# Patient Record
Sex: FEMALE | Race: WHITE | NOT HISPANIC OR LATINO | Employment: OTHER | URBAN - METROPOLITAN AREA
[De-identification: names, ages, dates, MRNs, and addresses within clinical notes are randomized per-mention and may not be internally consistent; named-entity substitution may affect disease eponyms.]

---

## 2020-01-06 ENCOUNTER — CONSULT (OUTPATIENT)
Dept: ONCOLOGY | Facility: CLINIC | Age: 70
End: 2020-01-06

## 2020-01-06 ENCOUNTER — LAB (OUTPATIENT)
Dept: LAB | Facility: HOSPITAL | Age: 70
End: 2020-01-06

## 2020-01-06 VITALS
SYSTOLIC BLOOD PRESSURE: 134 MMHG | TEMPERATURE: 97.2 F | HEIGHT: 69 IN | DIASTOLIC BLOOD PRESSURE: 78 MMHG | HEART RATE: 70 BPM | RESPIRATION RATE: 12 BRPM | OXYGEN SATURATION: 100 % | BODY MASS INDEX: 20.71 KG/M2 | WEIGHT: 139.8 LBS

## 2020-01-06 DIAGNOSIS — D05.11 DUCTAL CARCINOMA IN SITU (DCIS) OF RIGHT BREAST: Primary | ICD-10-CM

## 2020-01-06 DIAGNOSIS — C50.919 MALIGNANT NEOPLASM OF FEMALE BREAST, UNSPECIFIED ESTROGEN RECEPTOR STATUS, UNSPECIFIED LATERALITY, UNSPECIFIED SITE OF BREAST (HCC): Primary | ICD-10-CM

## 2020-01-06 LAB
BASOPHILS # BLD AUTO: 0.03 10*3/MM3 (ref 0–0.2)
BASOPHILS NFR BLD AUTO: 0.7 % (ref 0–1.5)
DEPRECATED RDW RBC AUTO: 45.5 FL (ref 37–54)
EOSINOPHIL # BLD AUTO: 0.09 10*3/MM3 (ref 0–0.4)
EOSINOPHIL NFR BLD AUTO: 2.2 % (ref 0.3–6.2)
ERYTHROCYTE [DISTWIDTH] IN BLOOD BY AUTOMATED COUNT: 13.5 % (ref 12.3–15.4)
HCT VFR BLD AUTO: 46.1 % (ref 34–46.6)
HGB BLD-MCNC: 14.4 G/DL (ref 12–15.9)
IMM GRANULOCYTES # BLD AUTO: 0 10*3/MM3 (ref 0–0.05)
IMM GRANULOCYTES NFR BLD AUTO: 0 % (ref 0–0.5)
LYMPHOCYTES # BLD AUTO: 1.3 10*3/MM3 (ref 0.7–3.1)
LYMPHOCYTES NFR BLD AUTO: 31.9 % (ref 19.6–45.3)
MCH RBC QN AUTO: 28.5 PG (ref 26.6–33)
MCHC RBC AUTO-ENTMCNC: 31.2 G/DL (ref 31.5–35.7)
MCV RBC AUTO: 91.3 FL (ref 79–97)
MONOCYTES # BLD AUTO: 0.35 10*3/MM3 (ref 0.1–0.9)
MONOCYTES NFR BLD AUTO: 8.6 % (ref 5–12)
NEUTROPHILS # BLD AUTO: 2.3 10*3/MM3 (ref 1.7–7)
NEUTROPHILS NFR BLD AUTO: 56.6 % (ref 42.7–76)
NRBC BLD AUTO-RTO: 0 /100 WBC (ref 0–0.2)
PLATELET # BLD AUTO: 229 10*3/MM3 (ref 140–450)
PMV BLD AUTO: 9.6 FL (ref 6–12)
RBC # BLD AUTO: 5.05 10*6/MM3 (ref 3.77–5.28)
WBC NRBC COR # BLD: 4.07 10*3/MM3 (ref 3.4–10.8)

## 2020-01-06 PROCEDURE — 99204 OFFICE O/P NEW MOD 45 MIN: CPT | Performed by: INTERNAL MEDICINE

## 2020-01-06 PROCEDURE — 36416 COLLJ CAPILLARY BLOOD SPEC: CPT

## 2020-01-06 PROCEDURE — 85025 COMPLETE CBC W/AUTO DIFF WBC: CPT

## 2020-01-06 NOTE — PROGRESS NOTES
Subjective     REASON FOR CONSULTATION: History of ductal carcinoma in situ of the right breast  Provide an opinion on any further workup or treatment                             REQUESTING PHYSICIAN:   Dr. Howard    RECORDS OBTAINED:  Records of the patients history including those obtained from the referring provider were reviewed and summarized in detail.      History of Present Illness   This is a pleasant 69-year-old woman who was previously treated at the [facility].  The patient had abnormal mammography performed 1/6/2018 showing a questionable abnormality in the right breast showing calcifications in the posterior breast and a six-month follow-up mammogram was recommended.  On the six-month follow-up mammogram 6/14/2018 there were noted to be increasing calcifications with pleomorphic morphology in the posterior right breast; a stereotactic biopsy was performed 6/21/2018 which showed high-grade ductal carcinoma in situ with comedonecrosis ER low +2% and weak, MA negative.  The patient decided to undergo bilateral mastectomies and a sentinel lymph node procedure was performed on the right.  The right breast showed ductal carcinoma in situ high nuclear grade with comedonecrosis measuring 2 cm in greatest dimension.  The deep margin was negative but close at 0.13 mm.  The left breast showed focal atypical ductal hyperplasia and fibrocystic changes.  Postoperatively the patient was seen by radiation oncology for close margins of DCIS, but radiation therapy was not currently recommended.  The patient underwent bilateral breast reconstruction.  Her case was presented at a breast conference at the previous treating location and it was recommended that she have annual breast MRI for screening/monitoring of the chest wall secondary to the close posterior margins.  Since that time the patient has required a pacemaker to be placed which of course will make MRI exams and possible.      The patient  "comes in today to establish care.  She has noticed no changes on the self breast exams.  She denies unusual pain, shortness of breath or cough.  She has no other concerns.    Past Medical History:   Diagnosis Date   • Arthritis    • Breast cancer (CMS/HCC)     Right   • H/O Heart block     3rd degree   • Heartburn    • History of cataract    • History of rectal bleeding    • Pacemaker         Past Surgical History:   Procedure Laterality Date   • COLONOSCOPY  2018   • MASTECTOMY Bilateral 2019   • PACEMAKER IMPLANTATION  2019        No current outpatient medications on file prior to visit.     No current facility-administered medications on file prior to visit.         ALLERGIES:  Allergies not on file     Social History     Socioeconomic History   • Marital status:      Spouse name: Not on file   • Number of children: Not on file   • Years of education: Not on file   • Highest education level: Not on file   Occupational History     Employer: RETIRED   Tobacco Use   • Smoking status: Never Smoker   • Smokeless tobacco: Never Used   Substance and Sexual Activity   • Alcohol use: Yes     Comment: Social   • Drug use: Never        History reviewed. No pertinent family history.     Review of Systems   Constitutional: Negative.    HENT: Negative.    Respiratory: Negative.    Cardiovascular: Negative.    Gastrointestinal: Negative.    Genitourinary: Negative.    Musculoskeletal: Positive for back pain. Negative for arthralgias and myalgias.   Skin: Negative.    Allergic/Immunologic: Negative.    Neurological: Negative.    Hematological: Negative.    Psychiatric/Behavioral: Negative.           Objective     Vitals:    01/06/20 1545   BP: 134/78   Pulse: 70   Resp: 12   Temp: 97.2 °F (36.2 °C)   TempSrc: Oral   SpO2: 100%   Weight: 63.4 kg (139 lb 12.8 oz)   Height: 175.2 cm (68.98\")   PainSc: 0-No pain     Current Status 1/6/2020   ECOG score 0       Physical Exam    CON: pleasant well-developed adult woman  HEENT: " no icterus, no thrush, moist membranes  NECK: no jvd  LYMPH: no cervical or supraclavicular lad  CV: RRR, S1S2, no murmur, pacemaker left chest wall  BREASTS: There have been bilateral mastectomies with reconstructed breast, no nodules or axillary lymph nodes noted  RESP: cta bilat, no wheezing, no rales  GI: soft, non-tender, no splenomegaly, +bs  MUSC: no edema, normal gait  NEURO: alert and oriented x3, normal strength  PSYCH: normal mood /affect    RECENT LABS:  Hematology WBC   Date Value Ref Range Status   01/06/2020 4.07 3.40 - 10.80 10*3/mm3 Final     RBC   Date Value Ref Range Status   01/06/2020 5.05 3.77 - 5.28 10*6/mm3 Final     Hemoglobin   Date Value Ref Range Status   01/06/2020 14.4 12.0 - 15.9 g/dL Final     Hematocrit   Date Value Ref Range Status   01/06/2020 46.1 34.0 - 46.6 % Final     Platelets   Date Value Ref Range Status   01/06/2020 229 140 - 450 10*3/mm3 Final          Assessment/Plan     This is a pleasant 69-year-old woman with history of ductal carcinoma in situ of the right breast treated with bilateral mastectomies summer 2018.  The DCIS was approximately 2 cm in size high-grade ER weakly 2% positive, ID negative.  The patient had close posterior margins but was reviewed by radiation oncology at an outside facility and recommended against adjuvant radiation therapy.  It was recommended from her previous tumor board to have an annual MRI of the breasts for follow-up of close margins.  However the patient now has a pacemaker in place so MRI exams will not be possible.  I do not think mammographic screening will be very beneficial in assessing the reconstructed breasts.  I recommended that we follow her based on physical examinations every 6 months.  The patient has not been performing self exams and I recommended she perform a monthly self breast exam with particular attention to the edges of the reconstructed breasts and the chest wall as much as possible.  Her exam was normal today  and I will see her back in 6 months.

## 2020-03-18 ENCOUNTER — TELEPHONE (OUTPATIENT)
Dept: ONCOLOGY | Facility: CLINIC | Age: 70
End: 2020-03-18

## 2020-03-18 DIAGNOSIS — N63.0 BREAST LUMP OR MASS: ICD-10-CM

## 2020-03-18 DIAGNOSIS — D05.11 DUCTAL CARCINOMA IN SITU (DCIS) OF RIGHT BREAST: Primary | ICD-10-CM

## 2020-03-18 NOTE — TELEPHONE ENCOUNTER
"Lengthy conversation with pt concerning a small pea size moveable lump she found near the axillary region of her rt. Breast.  Pt. Has had bilat. Mastectomies with reconstructive surgery in [place].  States I DID NOT RECEIVE ANY CHEMOTHERAPY OR RADIATION AND I DO NOT LIKE TO TAKE MEDICATIONS.  Goes on to say she lives with her fiance who is dr. Powers who really keeps up with everything and he isn't with her right now.  She called her surgeon in [place] when she found the lump and she was told she needed some x-rays etc.  Pt. States she was told to never get a MRI because she has a pacemaker.  States she faithfully does self breast exams.  She reports she did get established with a cardiologist here, but cannot think of the name.  She was told per her cardiologist that she has a pacemaker which is \"compatible with having a MRI.\"   States she was given a device called a \"donut\" which you place over the pacemaker when having a mri done.  Inquiring what she needs to do.  Pt. Doesn't have a return appt here.  All d/w dr. Blanco,  Informed pt. We need to get a letter from her cardiologist stating what the device is and they feel comfortable with her having mri's done.  Fax number given.  We will also get a u/s done of the rt. Breast for now.  Informed pt. The appt desk will call her with the date and time.  Pt. Request that it not be scheduled until after 3/31/20 as she and her fiance are getting ready to take a trip . Informed pt. I will let them know in the message that I send them.  V/u.  "

## 2020-04-01 ENCOUNTER — APPOINTMENT (OUTPATIENT)
Dept: ULTRASOUND IMAGING | Facility: HOSPITAL | Age: 70
End: 2020-04-01

## 2020-04-06 ENCOUNTER — HOSPITAL ENCOUNTER (OUTPATIENT)
Dept: ULTRASOUND IMAGING | Facility: HOSPITAL | Age: 70
Discharge: HOME | End: 2020-04-06
Admitting: INTERNAL MEDICINE

## 2020-04-06 ENCOUNTER — TELEPHONE (OUTPATIENT)
Dept: ONCOLOGY | Facility: HOSPITAL | Age: 70
End: 2020-04-06

## 2020-04-06 DIAGNOSIS — D05.11 DUCTAL CARCINOMA IN SITU (DCIS) OF RIGHT BREAST: ICD-10-CM

## 2020-04-06 DIAGNOSIS — N63.0 BREAST LUMP OR MASS: ICD-10-CM

## 2020-04-06 PROCEDURE — 76882 US LMTD JT/FCL EVL NVASC XTR: CPT

## 2020-04-06 NOTE — TELEPHONE ENCOUNTER
Pt called, per request of Dr. Blanco, to let her know that her US showed normal lymph nodes in the area of concern.  He recommended she see a breast surgeon if she is still concerned about what she is feeling.  Pt v/u and stated she did not have a breast surgeon in [place].  Discussed with Dr. Blanco and Chilo Nettles, or Lisa recommended.  Pt does not want us to make a referral or appointment at this time.  Pt given names of breast surgeons and instructed to call if needing referral.  Pt v/u.

## 2020-07-09 ENCOUNTER — TELEPHONE (OUTPATIENT)
Dept: ONCOLOGY | Facility: CLINIC | Age: 70
End: 2020-07-09

## 2020-07-09 NOTE — TELEPHONE ENCOUNTER
PT CALLED TO CANCEL HER 7/13 APPT WITH DR NAVARRO. SHE HAS FOUND ANOTHER DOCTOR AND WILL NOT BE RESCHEDULING.

## 2020-07-13 ENCOUNTER — APPOINTMENT (OUTPATIENT)
Dept: LAB | Facility: HOSPITAL | Age: 70
End: 2020-07-13

## 2021-08-10 NOTE — PROGRESS NOTES
"Chief Complaint   Patient presents with   • Abdominal Pain   • Diarrhea   • Bloated         History of Present Illness  Patient is a 71-year-old female who presents today for follow-up.    She had a colonoscopy November 2019.  Colonoscopy showed diverticulosis and a tortuous colon as well as internal hemorrhoids.  She had a CT performed in 2019 with no findings to explain symptoms.    Patient presents today with concerns about abdominal pain, bloating, gas, and change in bowel habits.  She reports symptoms began around 6 months ago.  Pain has been located to the left lower quadrant and described as an ache but also at times located to the periumbilical area.  She has noted increased gas and bloating and belching.    She reports she has been experiencing diarrhea, reports her bowel movements have been uncontrollable at times and loose and watery.  She denies any blood in the stool.    She denies any nausea or vomiting.    Prior to symptom onset, denied any travel or antibiotic use.    She saw her primary care provider for these symptoms and a CT of her chest and abdomen was performed.  CT abdomen with no acute findings, normal-appearing bowel.  She had lab work to evaluate for celiac disease which was negative.  CBC and CMP were normal.  GLENN was checked and was negative.  Sed rate and CRP also normal.      Review of Systems   Constitutional: Positive for diaphoresis. Negative for fever.        Result Review :       PHYSICIAN PROGRESS NOTES - SCAN - PROG NOTES-11/11/19 DR. SHERICE TRUJILLO (11/11/2019)   RADIOLOGY - SCAN - CT ABD/PELVIS-11/12/19 [facility] (11/12/2019)   PATHOLOGY - SCAN - PATH-RECTAL BX-11/22/19 DR. WOLFF [facility] (11/22/2019)   COLONOSCOPY - SCAN - COLONOSCOPY-11/22/19 DR. TEA WOLFF [facility] (11/22/2019)       Vital Signs:   /80   Pulse 73   Temp 97.7 °F (36.5 °C)   Ht 177.8 cm (70\")   Wt 62.6 kg (137 lb 14.4 oz)   SpO2 98%   BMI 19.79 kg/m²     Body mass index is 19.79 kg/m².     Physical " Exam  Vitals reviewed.   Constitutional:       General: She is not in acute distress.     Appearance: She is well-developed.   HENT:      Head: Normocephalic and atraumatic.   Pulmonary:      Effort: Pulmonary effort is normal. No respiratory distress.   Abdominal:      General: Abdomen is flat. Bowel sounds are normal. There is no distension.      Palpations: Abdomen is soft.      Tenderness: There is no abdominal tenderness.   Skin:     General: Skin is dry.      Coloration: Skin is not pale.   Neurological:      Mental Status: She is alert and oriented to person, place, and time.   Psychiatric:         Thought Content: Thought content normal.           Assessment and Plan    Diagnoses and all orders for this visit:    1. Diarrhea, unspecified type (Primary)  -     Gastrointestinal Panel, PCR - Stool, Per Rectum  -     Clostridium Difficile Toxin, PCR - Stool, Per Rectum  -     Fecal Leukocytes - Stool, Per Rectum    2. Left lower quadrant abdominal pain    3. Periumbilical abdominal pain    4. Bloating         Discussion  Patient presents today with new complaint of abdominal pain, bloating, and diarrhea.  We will have her submit a stool sample to evaluate for infection.  Discussed with her if stool sample is negative, symptoms may be secondary to IBS-D or possible small intestinal bacterial overgrowth.  If stool sample is negative, we will plan to send in prescription for Xifaxan to address these concerns.  If no improvement with Xifaxan, we may consider flexible sigmoidoscopy with random biopsies to evaluate for fluoroscopic colitis.          Follow Up   Return if symptoms worsen or fail to improve.    Patient Instructions   Obtain stool studies to assess for infection.    Further recommendations to be made pending results of the above work-up and clinical course.

## 2021-08-12 ENCOUNTER — OFFICE VISIT (OUTPATIENT)
Dept: GASTROENTEROLOGY | Facility: CLINIC | Age: 71
End: 2021-08-12

## 2021-08-12 VITALS
OXYGEN SATURATION: 98 % | HEART RATE: 73 BPM | BODY MASS INDEX: 19.74 KG/M2 | DIASTOLIC BLOOD PRESSURE: 80 MMHG | HEIGHT: 70 IN | TEMPERATURE: 97.7 F | SYSTOLIC BLOOD PRESSURE: 118 MMHG | WEIGHT: 137.9 LBS

## 2021-08-12 DIAGNOSIS — R10.32 LEFT LOWER QUADRANT ABDOMINAL PAIN: ICD-10-CM

## 2021-08-12 DIAGNOSIS — R19.7 DIARRHEA, UNSPECIFIED TYPE: Primary | ICD-10-CM

## 2021-08-12 DIAGNOSIS — R14.0 BLOATING: ICD-10-CM

## 2021-08-12 DIAGNOSIS — R10.33 PERIUMBILICAL ABDOMINAL PAIN: ICD-10-CM

## 2021-08-12 PROCEDURE — 99204 OFFICE O/P NEW MOD 45 MIN: CPT | Performed by: NURSE PRACTITIONER

## 2021-08-12 RX ORDER — MULTIPLE VITAMINS W/ MINERALS TAB 9MG-400MCG
TAB ORAL
COMMUNITY

## 2021-08-12 RX ORDER — MELATONIN
2000 DAILY
COMMUNITY
End: 2022-08-02

## 2021-08-12 NOTE — PATIENT INSTRUCTIONS
Obtain stool studies to assess for infection.    Further recommendations to be made pending results of the above work-up and clinical course.

## 2021-08-25 LAB
ADV 40+41 DNA STL QL NAA+NON-PROBE: NOT DETECTED
ASTRO TYP 1-8 RNA STL QL NAA+NON-PROBE: NOT DETECTED
C CAYETANENSIS DNA STL QL NAA+NON-PROBE: NOT DETECTED
C COLI+JEJ+UPSA DNA STL QL NAA+NON-PROBE: NOT DETECTED
C DIF TOX TCDA+TCDB STL QL NAA+NON-PROBE: NOT DETECTED
C DIFF TOX GENS STL QL NAA+PROBE: NEGATIVE
CRYPTOSP DNA STL QL NAA+NON-PROBE: NOT DETECTED
E COLI O157 DNA STL QL NAA+NON-PROBE: NORMAL
E HISTOLYT DNA STL QL NAA+NON-PROBE: NOT DETECTED
EAEC PAA PLAS AGGR+AATA ST NAA+NON-PRB: NOT DETECTED
EC STX1+STX2 GENES STL QL NAA+NON-PROBE: NOT DETECTED
EPEC EAE GENE STL QL NAA+NON-PROBE: NOT DETECTED
ETEC LTA+ST1A+ST1B TOX ST NAA+NON-PROBE: NOT DETECTED
G LAMBLIA DNA STL QL NAA+NON-PROBE: NOT DETECTED
NOROVIRUS GI+II RNA STL QL NAA+NON-PROBE: NOT DETECTED
P SHIGELLOIDES DNA STL QL NAA+NON-PROBE: NOT DETECTED
RVA RNA STL QL NAA+NON-PROBE: NOT DETECTED
S ENT+BONG DNA STL QL NAA+NON-PROBE: NOT DETECTED
SAPO I+II+IV+V RNA STL QL NAA+NON-PROBE: NOT DETECTED
SHIGELLA SP+EIEC IPAH ST NAA+NON-PROBE: NOT DETECTED
V CHOL+PARA+VUL DNA STL QL NAA+NON-PROBE: NOT DETECTED
V CHOLERAE DNA STL QL NAA+NON-PROBE: NOT DETECTED
WBC STL QL MICRO: NORMAL
WBC STL QL MICRO: NORMAL
Y ENTEROCOL DNA STL QL NAA+NON-PROBE: NOT DETECTED

## 2021-08-26 ENCOUNTER — TELEPHONE (OUTPATIENT)
Dept: GASTROENTEROLOGY | Facility: CLINIC | Age: 71
End: 2021-08-26

## 2021-08-26 NOTE — TELEPHONE ENCOUNTER
Xifaxan will cost the patient $1000 out of pocket. Patient wants to know if there is an alternative. Please advise.

## 2021-08-30 ENCOUNTER — TELEPHONE (OUTPATIENT)
Dept: GASTROENTEROLOGY | Facility: CLINIC | Age: 71
End: 2021-08-30

## 2021-08-30 NOTE — TELEPHONE ENCOUNTER
"Patient called with c/o stomach cramps & bloating, \"a lot of gas\", nausea, sweats X 2 last night. Denies fever. Denies diarrhea; states bowel movements are soft, but formed. She's been taking Xifaxan for about a week, so she stopped taking it when the symptoms began. She did says the symptoms come and go. She has appt with cc on 9/3/21, but wanted to know if there was something she could do in the meantime. Please advise.   "

## 2021-08-30 NOTE — TELEPHONE ENCOUNTER
I would recommend that she complete the full course of Xifaxan. We can also try dicyclomine 10 mg BID PRN cramping to see if that would help.

## 2021-08-31 DIAGNOSIS — R10.9 ABDOMINAL CRAMPING: ICD-10-CM

## 2021-08-31 DIAGNOSIS — R19.7 DIARRHEA, UNSPECIFIED TYPE: Primary | ICD-10-CM

## 2021-08-31 RX ORDER — DICYCLOMINE HYDROCHLORIDE 10 MG/1
10 CAPSULE ORAL 2 TIMES DAILY PRN
Qty: 60 CAPSULE | Refills: 5 | Status: SHIPPED | OUTPATIENT
Start: 2021-08-31 | End: 2022-08-02

## 2021-09-03 ENCOUNTER — OFFICE VISIT (OUTPATIENT)
Dept: GASTROENTEROLOGY | Facility: CLINIC | Age: 71
End: 2021-09-03

## 2021-09-03 DIAGNOSIS — K58.0 IRRITABLE BOWEL SYNDROME WITH DIARRHEA: Primary | ICD-10-CM

## 2021-09-03 PROCEDURE — 99441 PR PHYS/QHP TELEPHONE EVALUATION 5-10 MIN: CPT | Performed by: NURSE PRACTITIONER

## 2021-09-03 NOTE — PROGRESS NOTES
Chief Complaint   Patient presents with   • Follow-up         History of Present Illness  Patient is a 71-year-old female who presents today for follow-up.  She was last seen in the office August 2021 for abdominal pain, bloating, diarrhea.  Stool studies were negative and she was prescribed Xifaxan for IBS-D/possible small intestinal bacterial overgrowth.  She had undergone a CT scan and lab work with her primary care provider which were unremarkable.    Patient presents today for follow-up.  She has been taking Xifaxan, has a few days left, but reports her symptoms have improved.  She is no longer having any abdominal pain or bloating.  Her bowels are more solid.  She is down to having around 2 bowel movements per day.  She has noted some increased gas and heartburn but otherwise feels as though she is almost back to normal.    You have chosen to receive care through a telephone visit.   Do you consent to use a telephone visit for your medical care today? Yes         Result Review :      Gastrointestinal Panel, PCR - Stool, Per Rectum (08/23/2021 11:30)   Clostridium Difficile Toxin, PCR - Stool, Per Rectum (08/23/2021 11:30)   Fecal Leukocytes - Stool, Per Rectum (08/23/2021 11:30)   Office Visit with Danielle Calzada APRN (08/12/2021)   PHYSICIAN PROGRESS NOTES - SCAN - PROG NOTES-11/11/19 DR. SHERICE TRUJILLO (11/11/2019)   RADIOLOGY - SCAN - CT ABD/PELVIS-11/12/19 [facility] (11/12/2019)   PATHOLOGY - SCAN - PATH-RECTAL BX-11/22/19 DR. WOLFF [facility] (11/22/2019)   COLONOSCOPY - SCAN - COLONOSCOPY-11/22/19 DR. TEA WOLFF [facility] (11/22/2019)        Physical Exam - TELEPHONE VISIT      Assessment and Plan    Diagnoses and all orders for this visit:    1. Irritable bowel syndrome with diarrhea (Primary)         Discussion  Patient presents today for follow-up.  She has had resolution of pain and bloating and improvement in diarrhea after starting treatment with Xifaxan.  We will complete full course.  Discussed with  her today suspect symptoms likely secondary to IBS-D or possibly small intestinal bacterial overgrowth.  As symptoms have improved, no further treatment required.  Due to some persistent gas, recommended trial of low FODMAP diet and initiating a daily probiotic.  If symptoms recur in the future, can consider repeating course of Xifaxan if needed.      This visit has been rescheduled as a phone visit to comply with patient safety concerns in accordance with CDC recommendations. Total time of discussion was 8 minutes.    Follow Up   Return if symptoms worsen or fail to improve.    Patient Instructions   Recommend starting a daily probiotic.  Recommend Align or Fundability available over-the-counter.  Take 1 capsule daily with food.    For bloating, recommend trial of low FODMAP diet.  Handout provided for review.    Complete course of Xifaxan as directed.        Low-FODMAP Eating Plan    FODMAPs (fermentable oligosaccharides, disaccharides, monosaccharides, and polyols) are sugars that are hard for some people to digest. A low-FODMAP eating plan may help some people who have bowel (intestinal) diseases to manage their symptoms.  This meal plan can be complicated to follow. Work with a diet and nutrition specialist (dietitian) to make a low-FODMAP eating plan that is right for you. A dietitian can make sure that you get enough nutrition from this diet.  What are tips for following this plan?  Reading food labels  · Check labels for hidden FODMAPs such as:  ? High-fructose syrup.  ? Honey.  ? Agave.  ? Natural fruit flavors.  ? Onion or garlic powder.  · Choose low-FODMAP foods that contain 3-4 grams of fiber per serving.  · Check food labels for serving sizes. Eat only one serving at a time to make sure FODMAP levels stay low.  Meal planning  · Follow a low-FODMAP eating plan for up to 6 weeks, or as told by your health care provider or dietitian.  · To follow the eating plan:  1. Eliminate high-FODMAP foods  from your diet completely.  2. Gradually reintroduce high-FODMAP foods into your diet one at a time. Most people should wait a few days after introducing one high-FODMAP food before they introduce the next high-FODMAP food. Your dietitian can recommend how quickly you may reintroduce foods.  3. Keep a daily record of what you eat and drink, and make note of any symptoms that you have after eating.  4. Review your daily record with a dietitian regularly. Your dietitian can help you identify which foods you can eat and which foods you should avoid.  General tips  · Drink enough fluid each day to keep your urine pale yellow.  · Avoid processed foods. These often have added sugar and may be high in FODMAPs.  · Avoid most dairy products, whole grains, and sweeteners.  · Work with a dietitian to make sure you get enough fiber in your diet.  Recommended foods  Grains  · Gluten-free grains, such as rice, oats, buckwheat, quinoa, corn, polenta, and millet. Gluten-free pasta, bread, or cereal. Rice noodles. Corn tortillas.  Vegetables  · Eggplant, zucchini, cucumber, peppers, green beans, [place] sprouts, bean sprouts, lettuce, arugula, kale, Swiss chard, spinach, tod greens, bok sandra, summer squash, potato, and tomato. Limited amounts of corn, carrot, and sweet potato. Green parts of scallions.  Fruits  · Bananas, oranges, blayne, limes, blueberries, raspberries, strawberries, grapes, cantaloupe, honeydew melon, kiwi, papaya, passion fruit, and pineapple. Limited amounts of dried cranberries, banana chips, and shredded coconut.  Dairy  · Lactose-free milk, yogurt, and kefir. Lactose-free cottage cheese and ice cream. Non-dairy milks, such as almond, coconut, hemp, and rice milk. Yogurts made of non-dairy milks. Limited amounts of goat cheese, brie, mozzarella, parmesan, swiss, and other hard cheeses.  Meats and other protein foods  · Unseasoned beef, pork, poultry, or fish. Eggs. [place]. [place] (firm) and tempeh. Limited  "amounts of nuts and seeds, such as almonds, walnuts, brazil nuts, pecans, peanuts, pumpkin seeds, enrique seeds, and sunflower seeds.  Fats and oils  · Butter-free spreads. Vegetable oils, such as olive, canola, and sunflower oil.  Seasoning and other foods  · Artificial sweeteners with names that do not end in \"ol\" such as aspartame, saccharine, and stevia. Maple syrup, white table sugar, raw sugar, brown sugar, and molasses. Fresh basil, coriander, parsley, rosemary, and thyme.  Beverages  · Water and mineral water. Sugar-sweetened soft drinks. Small amounts of orange juice or cranberry juice. Black and green tea. Most dry thor. Coffee.  This may not be a complete list of low-FODMAP foods. Talk with your dietitian for more information.  Foods to avoid  Grains  · Wheat, including kamut, durum, and semolina. Barley and bulgur. Couscous. Wheat-based cereals. Wheat noodles, bread, crackers, and pastries.  Vegetables  · Chicory root, artichoke, asparagus, cabbage, snow peas, sugar snap peas, mushrooms, and cauliflower. Onions, garlic, leeks, and the white part of scallions.  Fruits  · Fresh, dried, and juiced forms of apple, pear, watermelon, peach, plum, cherries, apricots, blackberries, boysenberries, figs, nectarines, and sheba. Avocado.  Dairy  · Milk, yogurt, ice cream, and soft cheese. Cream and sour cream. Milk-based sauces. Custard.  Meats and other protein foods  · Fried or fatty meat. Sausage. Cashews and pistachios. Soybeans, baked beans, black beans, chickpeas, kidney beans, arlyn beans, navy beans, lentils, and split peas.  Seasoning and other foods  · Any sugar-free gum or candy. Foods that contain artificial sweeteners such as sorbitol, mannitol, isomalt, or xylitol. Foods that contain honey, high-fructose corn syrup, or agave. Bouillon, vegetable stock, beef stock, and chicken stock. Garlic and onion powder. Condiments made with onion, such as hummus, chutney, pickles, relish, salad dressing, and salsa. " Tomato paste.  Beverages  · Chicory-based drinks. Coffee substitutes. Chamomile tea. Fennel tea. Sweet or fortified thor such as port or jesús. Diet soft drinks made with isomalt, mannitol, maltitol, sorbitol, or xylitol. Apple, pear, and sheba juice. Juices with high-fructose corn syrup.  This may not be a complete list of high-FODMAP foods. Talk with your dietitian to discuss what dietary choices are best for you.   Summary  · A low-FODMAP eating plan is a short-term diet that eliminates FODMAPs from your diet to help ease symptoms of certain bowel diseases.  · The eating plan usually lasts up to 6 weeks. After that, high-FODMAP foods are restarted gradually, one at a time, so you can find out which may be causing symptoms.  · A low-FODMAP eating plan can be complicated. It is best to work with a dietitian who has experience with this type of plan.  This information is not intended to replace advice given to you by your health care provider. Make sure you discuss any questions you have with your health care provider.  Document Revised: 11/30/2018 Document Reviewed: 08/14/2018  [employer] Patient Education © 2021 [employer]

## 2021-09-03 NOTE — PATIENT INSTRUCTIONS
Recommend starting a daily probiotic.  Recommend Align or [employer] available over-the-counter.  Take 1 capsule daily with food.    For bloating, recommend trial of low FODMAP diet.  Handout provided for review.    Complete course of Xifaxan as directed.        Low-FODMAP Eating Plan    FODMAPs (fermentable oligosaccharides, disaccharides, monosaccharides, and polyols) are sugars that are hard for some people to digest. A low-FODMAP eating plan may help some people who have bowel (intestinal) diseases to manage their symptoms.  This meal plan can be complicated to follow. Work with a diet and nutrition specialist (dietitian) to make a low-FODMAP eating plan that is right for you. A dietitian can make sure that you get enough nutrition from this diet.  What are tips for following this plan?  Reading food labels  · Check labels for hidden FODMAPs such as:  ? High-fructose syrup.  ? Honey.  ? Agave.  ? Natural fruit flavors.  ? Onion or garlic powder.  · Choose low-FODMAP foods that contain 3-4 grams of fiber per serving.  · Check food labels for serving sizes. Eat only one serving at a time to make sure FODMAP levels stay low.  Meal planning  · Follow a low-FODMAP eating plan for up to 6 weeks, or as told by your health care provider or dietitian.  · To follow the eating plan:  1. Eliminate high-FODMAP foods from your diet completely.  2. Gradually reintroduce high-FODMAP foods into your diet one at a time. Most people should wait a few days after introducing one high-FODMAP food before they introduce the next high-FODMAP food. Your dietitian can recommend how quickly you may reintroduce foods.  3. Keep a daily record of what you eat and drink, and make note of any symptoms that you have after eating.  4. Review your daily record with a dietitian regularly. Your dietitian can help you identify which foods you can eat and which foods you should avoid.  General tips  · Drink enough fluid each day to keep  "your urine pale yellow.  · Avoid processed foods. These often have added sugar and may be high in FODMAPs.  · Avoid most dairy products, whole grains, and sweeteners.  · Work with a dietitian to make sure you get enough fiber in your diet.  Recommended foods  Grains  · Gluten-free grains, such as rice, oats, buckwheat, quinoa, corn, polenta, and millet. Gluten-free pasta, bread, or cereal. Rice noodles. Corn tortillas.  Vegetables  · Eggplant, zucchini, cucumber, peppers, green beans, [place] sprouts, bean sprouts, lettuce, arugula, kale, Swiss chard, spinach, tod greens, bok sandra, summer squash, potato, and tomato. Limited amounts of corn, carrot, and sweet potato. Green parts of scallions.  Fruits  · Bananas, oranges, blayne, limes, blueberries, raspberries, strawberries, grapes, cantaloupe, honeydew melon, kiwi, papaya, passion fruit, and pineapple. Limited amounts of dried cranberries, banana chips, and shredded coconut.  Dairy  · Lactose-free milk, yogurt, and kefir. Lactose-free cottage cheese and ice cream. Non-dairy milks, such as almond, coconut, hemp, and rice milk. Yogurts made of non-dairy milks. Limited amounts of goat cheese, brie, mozzarella, parmesan, swiss, and other hard cheeses.  Meats and other protein foods  · Unseasoned beef, pork, poultry, or fish. Eggs. Carreno. Tofu (firm) and tempeh. Limited amounts of nuts and seeds, such as almonds, walnuts, brazil nuts, pecans, peanuts, pumpkin seeds, enrique seeds, and sunflower seeds.  Fats and oils  · Butter-free spreads. Vegetable oils, such as olive, canola, and sunflower oil.  Seasoning and other foods  · Artificial sweeteners with names that do not end in \"ol\" such as aspartame, saccharine, and stevia. Maple syrup, white table sugar, raw sugar, brown sugar, and molasses. Fresh basil, coriander, parsley, rosemary, and thyme.  Beverages  · Water and mineral water. Sugar-sweetened soft drinks. Small amounts of orange juice or cranberry juice. Black " and green tea. Most dry thor. Coffee.  This may not be a complete list of low-FODMAP foods. Talk with your dietitian for more information.  Foods to avoid  Grains  · Wheat, including kamut, durum, and semolina. Barley and bulgur. Couscous. Wheat-based cereals. Wheat noodles, bread, crackers, and pastries.  Vegetables  · Chicory root, artichoke, asparagus, cabbage, snow peas, sugar snap peas, mushrooms, and cauliflower. Onions, garlic, leeks, and the white part of scallions.  Fruits  · Fresh, dried, and juiced forms of apple, pear, watermelon, peach, plum, cherries, apricots, blackberries, boysenberries, figs, nectarines, and sheba. Avocado.  Dairy  · Milk, yogurt, ice cream, and soft cheese. Cream and sour cream. Milk-based sauces. Custard.  Meats and other protein foods  · Fried or fatty meat. Sausage. Cashews and pistachios. Soybeans, baked beans, black beans, chickpeas, kidney beans, arlyn beans, navy beans, lentils, and split peas.  Seasoning and other foods  · Any sugar-free gum or candy. Foods that contain artificial sweeteners such as sorbitol, mannitol, isomalt, or xylitol. Foods that contain honey, high-fructose corn syrup, or agave. Bouillon, vegetable stock, beef stock, and chicken stock. Garlic and onion powder. Condiments made with onion, such as hummus, chutney, pickles, relish, salad dressing, and salsa. Tomato paste.  Beverages  · Chicory-based drinks. Coffee substitutes. Chamomile tea. Fennel tea. Sweet or fortified thor such as port or jesús. Diet soft drinks made with isomalt, mannitol, maltitol, sorbitol, or xylitol. Apple, pear, and sheba juice. Juices with high-fructose corn syrup.  This may not be a complete list of high-FODMAP foods. Talk with your dietitian to discuss what dietary choices are best for you.   Summary  · A low-FODMAP eating plan is a short-term diet that eliminates FODMAPs from your diet to help ease symptoms of certain bowel diseases.  · The eating plan usually lasts up to  6 weeks. After that, high-FODMAP foods are restarted gradually, one at a time, so you can find out which may be causing symptoms.  · A low-FODMAP eating plan can be complicated. It is best to work with a dietitian who has experience with this type of plan.  This information is not intended to replace advice given to you by your health care provider. Make sure you discuss any questions you have with your health care provider.  Document Revised: 11/30/2018 Document Reviewed: 08/14/2018  Elsevier Patient Education © 2021 [employer] Inc.

## 2022-08-01 ENCOUNTER — TELEPHONE (OUTPATIENT)
Dept: GASTROENTEROLOGY | Facility: CLINIC | Age: 72
End: 2022-08-01

## 2022-08-02 ENCOUNTER — PREP FOR SURGERY (OUTPATIENT)
Dept: SURGERY | Facility: SURGERY CENTER | Age: 72
End: 2022-08-02

## 2022-08-02 ENCOUNTER — OFFICE VISIT (OUTPATIENT)
Dept: GASTROENTEROLOGY | Facility: CLINIC | Age: 72
End: 2022-08-02

## 2022-08-02 ENCOUNTER — TELEPHONE (OUTPATIENT)
Dept: GASTROENTEROLOGY | Facility: CLINIC | Age: 72
End: 2022-08-02

## 2022-08-02 VITALS
DIASTOLIC BLOOD PRESSURE: 76 MMHG | HEART RATE: 91 BPM | TEMPERATURE: 97.6 F | HEIGHT: 70 IN | OXYGEN SATURATION: 98 % | BODY MASS INDEX: 19.83 KG/M2 | SYSTOLIC BLOOD PRESSURE: 110 MMHG | WEIGHT: 138.5 LBS

## 2022-08-02 DIAGNOSIS — K62.5 BRIGHT RED BLOOD PER RECTUM: ICD-10-CM

## 2022-08-02 DIAGNOSIS — K62.5 BRIGHT RED BLOOD PER RECTUM: Primary | ICD-10-CM

## 2022-08-02 DIAGNOSIS — K60.2 ANAL FISSURE: Primary | ICD-10-CM

## 2022-08-02 DIAGNOSIS — K60.2 ANAL FISSURE: ICD-10-CM

## 2022-08-02 PROCEDURE — 99214 OFFICE O/P EST MOD 30 MIN: CPT

## 2022-08-02 RX ORDER — SODIUM CHLORIDE, SODIUM LACTATE, POTASSIUM CHLORIDE, CALCIUM CHLORIDE 600; 310; 30; 20 MG/100ML; MG/100ML; MG/100ML; MG/100ML
30 INJECTION, SOLUTION INTRAVENOUS CONTINUOUS PRN
Status: CANCELLED | OUTPATIENT
Start: 2022-08-02

## 2022-08-02 RX ORDER — ZINC GLUCONATE 50 MG
50 TABLET ORAL DAILY
COMMUNITY

## 2022-08-02 RX ORDER — SODIUM CHLORIDE 0.9 % (FLUSH) 0.9 %
10 SYRINGE (ML) INJECTION AS NEEDED
Status: CANCELLED | OUTPATIENT
Start: 2022-08-02

## 2022-08-02 RX ORDER — SODIUM CHLORIDE 0.9 % (FLUSH) 0.9 %
3 SYRINGE (ML) INJECTION EVERY 12 HOURS SCHEDULED
Status: CANCELLED | OUTPATIENT
Start: 2022-08-02

## 2022-08-02 NOTE — PROGRESS NOTES
"Chief Complaint   Patient presents with   • Rectal Bleeding           History of Present Illness  Patient is a 71-year-old female who presents the office today for follow-up.  She was last seen in the office in September 2021 for evaluation of irritable bowel syndrome with diarrhea.    Last colonoscopy evaluation was in November 2019 with Dr. Morillo and revealed few small mouth diverticula in the sigmoid colon and a moderately tortuous colon.  A patchy area of mildly granular mucosa was found in the rectum along with nonbleeding internal hemorrhoids that were mild in nature.    Recommended surveillance colonoscopy in 7 years for screening due September 2026.    She presents today with concern of bright red blood noted from rectum that was first witnessed on 7/30/22 after evacuation of a hard stool mass with one strip of bright red blood noted on circumference of stool with daily am bowel movements.  Denies presence of blood noted with most recent bowel movement this morning.     Denies abdominal or rectal pain. Reports that rectal bleeding was isolated to passage of stool without presence on undergarments between bowel movements.     She denies unintentional weight loss.  Denies rectal trauma.     Reports that she has plans to travel outside of the [facility] and will not be back in town for 8 weeks.     Review of Systems      Result Review :           Vital Signs:   /76   Pulse 91   Temp 97.6 °F (36.4 °C)   Ht 177.8 cm (70\")   Wt 62.8 kg (138 lb 8 oz)   SpO2 98%   BMI 19.87 kg/m²     Body mass index is 19.87 kg/m².     Physical Exam  Vitals reviewed.   Constitutional:       General: She is not in acute distress.     Appearance: She is well-developed. She is not diaphoretic.   HENT:      Head: Normocephalic and atraumatic.   Eyes:      General: No scleral icterus.  Cardiovascular:      Rate and Rhythm: Normal rate and regular rhythm.   Pulmonary:      Effort: Pulmonary effort is normal.      Breath sounds: " Normal breath sounds.   Abdominal:      General: Abdomen is flat. Bowel sounds are normal. There is no distension.      Palpations: Abdomen is soft. There is no mass.      Tenderness: There is no abdominal tenderness. There is no guarding or rebound.      Hernia: No hernia is present.   Skin:     General: Skin is warm and dry.   Neurological:      Mental Status: She is alert and oriented to person, place, and time.   Psychiatric:         Judgment: Judgment normal.           Assessment and Plan    Diagnoses and all orders for this visit:    1. Anal fissure (Primary)    2. Bright red blood per rectum       Discussion:    Patient is a pleasant 72-year-old female presents to the office today with concerns of bright red blood from rectum x3 days with defecation.  Reports that single stripe of bright red blood was noted on outside of stool.     Rectal exam revealed an approximate non-bleeding 1-2 mm vertical tear at the 6 o'clock position of the distal external sphincter muscle. I encouraged patient to increase dietary intake of fiber as well as compounded prescription sent to [facility] for patient to apply BID x 4 weeks.     Discussed with patient, Dr. Morillo's recommendation for flexible sigmoidoscopy upon her return in 8 weeks to further assess healing of site.  Orders placed today.   Patient is agreeable to this plan and will contact office in approximately 4 weeks to update provider on symptom response to regimen or sooner for any new or worsening concerns.    Patient is agreeable to the outlined treatment plan all questions answered and support provided.          Patient Instructions   For Anal Fissure:    Written information provided regarding anal fissure.Treatment for anal fissure includes keeping bowel movements soft and healing of the fissure can take over 4 weeks.      We recommend the following steps:    #1.  Recommend plenty of water    #2.  Fiber supplement such as Metamucil or FiberCon daily.      #3.  Stool softener if fiber supplements are not enough.    #4.  We also use topical medications to help relax the anal sphincter.      This is a compounded cream and will be sent to [facility]     You apply the cream to the fissure twice per day for 30-days even if the pain has improved to allow the fissure to heal completely.    #5. Also soak buttocks in 2 to 3 inches of warm water 2-3 times per day for 10 to 15 minutes each day.    If symptoms persist after 4 weeks, we continue treatment for an additional 4 weeks.  Please let us know to discuss symptoms and for additional recommendations.                  EMR Dragon/Transcription Disclaimer:  This document has been Dictated utilizing Dragon dictation.

## 2022-08-02 NOTE — TELEPHONE ENCOUNTER
Called Rx in to [facility] for treatment of anal fissure: Diltiazem 2%, Lidocaine 5%; 30 gm tube with 2 refills; apply pea-size amt to area  2 times a day X 4 weeks. Lb/kp

## 2022-08-02 NOTE — PATIENT INSTRUCTIONS
For Anal Fissure:    Written information provided regarding anal fissure.Treatment for anal fissure includes keeping bowel movements soft and healing of the fissure can take over 4 weeks.      We recommend the following steps:    #1.  Recommend plenty of water    #2.  Fiber supplement such as Metamucil or FiberCon daily.     #3.  Stool softener if fiber supplements are not enough.    #4.  We also use topical medications to help relax the anal sphincter.      This is a compounded cream and will be sent to [facility]     You apply the cream to the fissure twice per day for 30-days even if the pain has improved to allow the fissure to heal completely.    #5. Also soak buttocks in 2 to 3 inches of warm water 2-3 times per day for 10 to 15 minutes each day.    If symptoms persist after 4 weeks, we continue treatment for an additional 4 weeks.  Please let us know to discuss symptoms and for additional recommendations.

## 2022-11-18 ENCOUNTER — OFFICE VISIT (OUTPATIENT)
Dept: GASTROENTEROLOGY | Facility: CLINIC | Age: 72
End: 2022-11-18

## 2022-11-18 VITALS
BODY MASS INDEX: 20.54 KG/M2 | OXYGEN SATURATION: 98 % | HEART RATE: 80 BPM | DIASTOLIC BLOOD PRESSURE: 82 MMHG | SYSTOLIC BLOOD PRESSURE: 108 MMHG | HEIGHT: 70 IN | TEMPERATURE: 97.7 F | WEIGHT: 143.5 LBS

## 2022-11-18 DIAGNOSIS — R19.7 DIARRHEA, UNSPECIFIED TYPE: Primary | ICD-10-CM

## 2022-11-18 PROCEDURE — 99213 OFFICE O/P EST LOW 20 MIN: CPT

## 2022-11-18 NOTE — PATIENT INSTRUCTIONS
Stool Testing for Diarrhea:    We will check stool studies to assess for any infectious etiology that could be contributing to your symptoms.   Stool Specimens kit will be given today by the lab  If you cannot return stool specimens to lab within 24 hours, place specimen in provided bag with kit and keep in refrigerator until you are able to drop off specimen  Our office will contact you once we receive these results to discuss updating treatment plan as indicated   3.  I put in the orders for this, and you can go to the outpatient lab at any [facility] [place] facility to  the stool kit for this. [facility], [facility], or our office building are all OK. Any  time Monday-Friday from 8-4 is OK for the      We recommend starting a daily fiber supplement such as metamucil or benefiber    These can be purchased over-the-counter, generic brand is fine.  Fiber supplements can take 12 to 72 hours to start working. Make sure to drink 6 to 12 ounces of water or noncarbonated beverage with fiber supplement.     Recommended starting with half of product listed daily dose for 7 days to help reduce risk of abdominal bloating/gas and allow your body to acclimate to fiber diet intake.  If you do not experience any of these adverse effects may increase to daily dose listed on product.    For Abdominal Bloating and Gas:    Recommend starting a daily probiotic.  Recommend Align or [employer] available over-the-counter.  Take 1 capsule daily with food.

## 2022-11-18 NOTE — PROGRESS NOTES
"Chief Complaint   Patient presents with   • Diarrhea           History of Present Illness    Patient is a 70-year-old female presents the office today for follow-up evaluation.  Last in office visit was on 8/2/2022.  She has a past medical history of irritable bowel syndrome with diarrhea and anal fissure.    Last colonoscopy evaluation was in November 2019 with Dr. Morillo revealed few small mouth diverticula in sigmoid colon, tortuous colon, nonbleeding internal hemorrhoids, and granular mucosa in rectum.    Next colonoscopy for colon cancer screening recommended in 7 years or September 2026.    She presents for follow-up evaluation.  Reports that since last visit she applied diltiazem/lidocaine cream to rectum and within 48 to 72 hours rectal bleeding had stopped and has not recurred.    Reports that over past 5 days however she has experienced increased stool frequency that is loose to watery in consistency.  Denies mucus or melena or hematochezia.  Denies nocturnal symptoms     No recent antibiotic use.  However recently to return home from extended travel.    Denies taking fiber supplementation at this time.  Denies abdominal pain.    Denies upper GI symptoms including heartburn, nausea, vomiting, or dysphagia.    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 Hicks and Dr. Sonny Cast.  Plans for surgical intervention for breast carcinoma in the upcoming weeks.     Result Review :       Office Visit with Beth Mahajan APRN (08/02/2022)      Vital Signs:   /82   Pulse 80   Temp 97.7 °F (36.5 °C)   Ht 177.8 cm (70\")   Wt 65.1 kg (143 lb 8 oz)   SpO2 98%   BMI 20.59 kg/m²     Body mass index is 20.59 kg/m².     Physical Exam  Vitals reviewed.   Constitutional:       General: She is not in acute distress.     Appearance: She is well-developed.   HENT:      Head: Normocephalic and " atraumatic.   Pulmonary:      Effort: Pulmonary effort is normal. No respiratory distress.   Abdominal:      General: Abdomen is flat. Bowel sounds are normal. There is no distension.      Palpations: Abdomen is soft. There is no mass.      Tenderness: There is abdominal tenderness. There is no guarding or rebound.      Hernia: No hernia is present.      Comments: Mild left lower quadrant tenderness reported with palpation    Skin:     General: Skin is dry.      Coloration: Skin is not pale.   Neurological:      Mental Status: She is alert and oriented to person, place, and time.   Psychiatric:         Thought Content: Thought content normal.           Assessment and Plan    Diagnoses and all orders for this visit:    1. Diarrhea, unspecified type (Primary)  -     Clostridioides difficile Toxin, PCR - Stool, Per Rectum  -     Calprotectin, Fecal - Stool, Per Rectum  -     Stool Culture (Reference Lab) - Stool, Per Rectum           Discussion:    Patient is a pleasant 72-year-old female presents the office for follow-up evaluation.    For diarrhea, recommend patient submit stool testing to rule out infectious etiology to increase stool frequency.  Suspect likely related to IBS-D however concern for infectious etiology secondary to recent extended travel.    Colonoscopy offered to patient and spouse and declines at this time stating they wish to complete recommended treatment for right breast carcinoma and will contact office after completion or if recurrence of rectal bleeding or persistence of diarrhea uncontrolled despite probiotic and fiber therapy.    Additionally, would recommend completing CT abdomen pelvis for change in the severity or frequency of abdominal pain and or fever or chills patient and family are agreeable with this plan.    Will contact office upon completion of breast carcinoma treatments for follow-up care.    Patient is agreeable to the outlined above treatment plan.  Verbalizes understanding  and will contact office for any new or worsening concerns.  All questions answered and support provided.      Patient Instructions   Stool Testing for Diarrhea:    1. We will check stool studies to assess for any infectious etiology that could be contributing to your symptoms.   2. Stool Specimens kit will be given today by the lab  If you cannot return stool specimens to lab within 24 hours, place specimen in provided bag with kit and keep in refrigerator until you are able to drop off specimen  Our office will contact you once we receive these results to discuss updating treatment plan as indicated   3.  I put in the orders for this, and you can go to the outpatient lab at any [facility] facility to  the stool kit for this. [facility], [facility], [facility], or our office building are all OK. Any  time Monday-Friday from 8-4 is OK for the      We recommend starting a daily fiber supplement such as metamucil or benefiber    These can be purchased over-the-counter, generic brand is fine.  Fiber supplements can take 12 to 72 hours to start working. Make sure to drink 6 to 12 ounces of water or noncarbonated beverage with fiber supplement.     Recommended starting with half of product listed daily dose for 7 days to help reduce risk of abdominal bloating/gas and allow your body to acclimate to fiber diet intake.  If you do not experience any of these adverse effects may increase to daily dose listed on product.    For Abdominal Bloating and Gas:    1. Recommend starting a daily probiotic.  Recommend Align or [employer] available over-the-counter.  Take 1 capsule daily with food.                EMR Dragon/Transcription Disclaimer:  This document has been Dictated utilizing Dragon dictation.

## 2022-11-28 ENCOUNTER — LAB (OUTPATIENT)
Dept: LAB | Facility: HOSPITAL | Age: 72
End: 2022-11-28

## 2022-11-28 PROCEDURE — 87427 SHIGA-LIKE TOXIN AG IA: CPT

## 2022-11-28 PROCEDURE — 83993 ASSAY FOR CALPROTECTIN FECAL: CPT

## 2022-11-28 PROCEDURE — 87493 C DIFF AMPLIFIED PROBE: CPT

## 2022-11-28 PROCEDURE — 87046 STOOL CULTR AEROBIC BACT EA: CPT

## 2022-11-28 PROCEDURE — 87045 FECES CULTURE AEROBIC BACT: CPT

## 2022-11-29 LAB — C DIFF TOX GENS STL QL NAA+PROBE: NEGATIVE

## 2022-11-30 LAB — CALPROTECTIN STL-MCNT: <16 UG/G (ref 0–120)

## 2022-12-02 LAB
BACTERIA SPEC CULT: NORMAL
BACTERIA SPEC CULT: NORMAL
CAMPYLOBACTER STL CULT: NORMAL
E COLI SXT STL QL IA: NEGATIVE
SALM + SHIG STL CULT: NORMAL

## 2022-12-05 ENCOUNTER — TELEPHONE (OUTPATIENT)
Dept: GASTROENTEROLOGY | Facility: CLINIC | Age: 72
End: 2022-12-05

## 2022-12-21 ENCOUNTER — PREP FOR SURGERY (OUTPATIENT)
Dept: SURGERY | Facility: SURGERY CENTER | Age: 72
End: 2022-12-21

## 2022-12-21 DIAGNOSIS — K62.5 BRIGHT RED BLOOD PER RECTUM: ICD-10-CM

## 2022-12-21 DIAGNOSIS — Z12.11 ENCOUNTER FOR SCREENING COLONOSCOPY: ICD-10-CM

## 2022-12-21 DIAGNOSIS — K60.2 ANAL FISSURE: ICD-10-CM

## 2022-12-21 DIAGNOSIS — R19.7 DIARRHEA, UNSPECIFIED TYPE: Primary | ICD-10-CM

## 2022-12-21 DIAGNOSIS — D05.11 DUCTAL CARCINOMA IN SITU (DCIS) OF RIGHT BREAST: ICD-10-CM

## 2022-12-21 RX ORDER — SODIUM CHLORIDE 0.9 % (FLUSH) 0.9 %
3 SYRINGE (ML) INJECTION EVERY 12 HOURS SCHEDULED
Status: CANCELLED | OUTPATIENT
Start: 2022-12-21

## 2022-12-21 RX ORDER — SODIUM CHLORIDE, SODIUM LACTATE, POTASSIUM CHLORIDE, CALCIUM CHLORIDE 600; 310; 30; 20 MG/100ML; MG/100ML; MG/100ML; MG/100ML
30 INJECTION, SOLUTION INTRAVENOUS CONTINUOUS PRN
Status: CANCELLED | OUTPATIENT
Start: 2022-12-21

## 2022-12-21 RX ORDER — SODIUM CHLORIDE 0.9 % (FLUSH) 0.9 %
10 SYRINGE (ML) INJECTION AS NEEDED
Status: CANCELLED | OUTPATIENT
Start: 2022-12-21

## 2022-12-22 ENCOUNTER — TELEPHONE (OUTPATIENT)
Dept: GASTROENTEROLOGY | Facility: CLINIC | Age: 72
End: 2022-12-22

## 2022-12-27 ENCOUNTER — TELEPHONE (OUTPATIENT)
Dept: GASTROENTEROLOGY | Facility: CLINIC | Age: 72
End: 2022-12-27

## 2022-12-27 NOTE — TELEPHONE ENCOUNTER
Patient called needs to schedule a (full) cls per Dr. Morillo,  Patient also said that she has had issues trying to get through

## 2022-12-28 PROBLEM — R19.7 DIARRHEA: Status: ACTIVE | Noted: 2022-12-28

## 2022-12-28 PROBLEM — Z12.11 ENCOUNTER FOR SCREENING COLONOSCOPY: Status: ACTIVE | Noted: 2022-12-28

## 2022-12-28 PROBLEM — K60.2 ANAL FISSURE: Status: ACTIVE | Noted: 2022-12-28

## 2022-12-28 PROBLEM — K62.5 BRIGHT RED BLOOD PER RECTUM: Status: ACTIVE | Noted: 2022-12-28

## 2023-02-09 NOTE — DISCHARGE INSTRUCTIONS
ENDOSCOPY - EGD/COLONOSCOPY       ADULT CARE DISCHARGE  INSTRUCTIONS     Symptoms you may temporarily experience:      Sore Throat     Hoarseness     Bloating/Cramping     Dizziness     IV Irritation/tenderness     Gas or Belching     Slight fever     Small amount of blood in vomit or stool       Call Your Doctor for the following Problems: ______________________     ________________     Fever of 101 degrees or higher       Sharp abdominal  pain     Red streak up the arm from the IV site     Severe cramping        Large amount of blood in stool or vomit       Instructions for the next 24 hours after your Procedure:     Adult supervision     Do NOT drink any alcohol      Do not work today     NO important decisions     DO NOT sign any legal documents     You may shower/ bathe       DO NOT  DRIVE or operate machinery     Resume normal activity tomorrow       Discharge  Diet:     Avoid spicy/ greasy foods     Avoid any food that will cause more gas or bloating       *** Seek IMMEDIATE medical attention and call 911 if you develop symptoms such as:     Chest pain     Shortness of breath     Severe bleeding

## 2023-02-13 ENCOUNTER — ANESTHESIA (OUTPATIENT)
Dept: SURGERY | Facility: SURGERY CENTER | Age: 73
End: 2023-02-13
Payer: MEDICARE

## 2023-02-13 ENCOUNTER — ANESTHESIA EVENT (OUTPATIENT)
Dept: SURGERY | Facility: SURGERY CENTER | Age: 73
End: 2023-02-13
Payer: MEDICARE

## 2023-02-13 ENCOUNTER — HOSPITAL ENCOUNTER (OUTPATIENT)
Facility: SURGERY CENTER | Age: 73
Setting detail: HOSPITAL OUTPATIENT SURGERY
Discharge: HOME | End: 2023-02-13
Attending: INTERNAL MEDICINE | Admitting: INTERNAL MEDICINE
Payer: MEDICARE

## 2023-02-13 VITALS
OXYGEN SATURATION: 98 % | SYSTOLIC BLOOD PRESSURE: 117 MMHG | WEIGHT: 136 LBS | HEIGHT: 69 IN | RESPIRATION RATE: 16 BRPM | DIASTOLIC BLOOD PRESSURE: 75 MMHG | TEMPERATURE: 98.2 F | BODY MASS INDEX: 20.14 KG/M2 | HEART RATE: 61 BPM

## 2023-02-13 DIAGNOSIS — D05.11 DUCTAL CARCINOMA IN SITU (DCIS) OF RIGHT BREAST: ICD-10-CM

## 2023-02-13 DIAGNOSIS — K62.5 BRIGHT RED BLOOD PER RECTUM: ICD-10-CM

## 2023-02-13 DIAGNOSIS — Z12.11 ENCOUNTER FOR SCREENING COLONOSCOPY: ICD-10-CM

## 2023-02-13 DIAGNOSIS — R19.7 DIARRHEA, UNSPECIFIED TYPE: ICD-10-CM

## 2023-02-13 DIAGNOSIS — K60.2 ANAL FISSURE: ICD-10-CM

## 2023-02-13 PROCEDURE — 45380 COLONOSCOPY AND BIOPSY: CPT | Performed by: INTERNAL MEDICINE

## 2023-02-13 PROCEDURE — 25010000002 PROPOFOL 10 MG/ML EMULSION: Performed by: ANESTHESIOLOGY

## 2023-02-13 PROCEDURE — 0 LIDOCAINE 1 % SOLUTION: Performed by: ANESTHESIOLOGY

## 2023-02-13 PROCEDURE — 88305 TISSUE EXAM BY PATHOLOGIST: CPT | Performed by: INTERNAL MEDICINE

## 2023-02-13 RX ORDER — FENTANYL CITRATE 50 UG/ML
25 INJECTION, SOLUTION INTRAMUSCULAR; INTRAVENOUS
Status: DISCONTINUED | OUTPATIENT
Start: 2023-02-13 | End: 2023-02-13 | Stop reason: HOSPADM

## 2023-02-13 RX ORDER — SODIUM CHLORIDE, SODIUM LACTATE, POTASSIUM CHLORIDE, CALCIUM CHLORIDE 600; 310; 30; 20 MG/100ML; MG/100ML; MG/100ML; MG/100ML
30 INJECTION, SOLUTION INTRAVENOUS CONTINUOUS PRN
Status: DISCONTINUED | OUTPATIENT
Start: 2023-02-13 | End: 2023-02-13 | Stop reason: HOSPADM

## 2023-02-13 RX ORDER — ONDANSETRON 2 MG/ML
4 INJECTION INTRAMUSCULAR; INTRAVENOUS ONCE AS NEEDED
Status: DISCONTINUED | OUTPATIENT
Start: 2023-02-13 | End: 2023-02-13 | Stop reason: HOSPADM

## 2023-02-13 RX ORDER — LABETALOL HYDROCHLORIDE 5 MG/ML
5 INJECTION, SOLUTION INTRAVENOUS
Status: DISCONTINUED | OUTPATIENT
Start: 2023-02-13 | End: 2023-02-13 | Stop reason: HOSPADM

## 2023-02-13 RX ORDER — HYDRALAZINE HYDROCHLORIDE 20 MG/ML
10 INJECTION INTRAMUSCULAR; INTRAVENOUS
Status: DISCONTINUED | OUTPATIENT
Start: 2023-02-13 | End: 2023-02-13 | Stop reason: HOSPADM

## 2023-02-13 RX ORDER — LIDOCAINE HYDROCHLORIDE 10 MG/ML
INJECTION, SOLUTION INFILTRATION; PERINEURAL AS NEEDED
Status: DISCONTINUED | OUTPATIENT
Start: 2023-02-13 | End: 2023-02-13 | Stop reason: SURG

## 2023-02-13 RX ORDER — SODIUM CHLORIDE 0.9 % (FLUSH) 0.9 %
10 SYRINGE (ML) INJECTION AS NEEDED
Status: DISCONTINUED | OUTPATIENT
Start: 2023-02-13 | End: 2023-02-13 | Stop reason: HOSPADM

## 2023-02-13 RX ORDER — PROPOFOL 10 MG/ML
VIAL (ML) INTRAVENOUS AS NEEDED
Status: DISCONTINUED | OUTPATIENT
Start: 2023-02-13 | End: 2023-02-13 | Stop reason: SURG

## 2023-02-13 RX ORDER — SODIUM CHLORIDE 0.9 % (FLUSH) 0.9 %
3 SYRINGE (ML) INJECTION EVERY 12 HOURS SCHEDULED
Status: DISCONTINUED | OUTPATIENT
Start: 2023-02-13 | End: 2023-02-13 | Stop reason: HOSPADM

## 2023-02-13 RX ADMIN — PROPOFOL 100 MG: 10 INJECTION, EMULSION INTRAVENOUS at 11:21

## 2023-02-13 RX ADMIN — LIDOCAINE HYDROCHLORIDE 30 MG: 10 INJECTION, SOLUTION INFILTRATION; PERINEURAL at 11:21

## 2023-02-13 RX ADMIN — SODIUM CHLORIDE, POTASSIUM CHLORIDE, SODIUM LACTATE AND CALCIUM CHLORIDE 30 ML/HR: 600; 310; 30; 20 INJECTION, SOLUTION INTRAVENOUS at 11:15

## 2023-02-13 RX ADMIN — PROPOFOL 160 MCG/KG/MIN: 10 INJECTION, EMULSION INTRAVENOUS at 11:21

## 2023-02-13 NOTE — ANESTHESIA PREPROCEDURE EVALUATION
Anesthesia Evaluation     no history of anesthetic complications:  NPO Solid Status: > 8 hours  NPO Liquid Status: > 2 hours           Airway   Mallampati: II  Neck ROM: full  no difficulty expected  Dental - normal exam     Pulmonary - negative pulmonary ROS and normal exam   (-) COPD, asthma, sleep apnea, not a smoker    PE comment: nonlabored  Cardiovascular - normal exam  Exercise tolerance: good (4-7 METS)    Rhythm: regular  Rate: normal    (+) pacemaker pacemaker, dysrhythmias (3rd degree heart block) Bradycardia,   (-) hypertension, valvular problems/murmurs, past MI, CAD, angina      Neuro/Psych- negative ROS  (-) seizures, TIA, CVA  GI/Hepatic/Renal/Endo    (+)  GI bleeding (rectal bleeding) , thyroid problem   (-) GERD, liver disease, no renal disease, diabetes    Musculoskeletal     (+) arthralgias, back pain,   Abdominal    Substance History      OB/GYN          Other   arthritis,    history of cancer (R breast cancer)                  Anesthesia Plan    ASA 3     MAC       Anesthetic plan, risks, benefits, and alternatives have been provided, discussed and informed consent has been obtained with: patient.        CODE STATUS:

## 2023-02-13 NOTE — ANESTHESIA POSTPROCEDURE EVALUATION
"Patient: Mai Pak    Procedure Summary     Date: 02/13/23 Room / Location: [facility]  / [facility]    Anesthesia Start: 1118 Anesthesia Stop: 1144    Procedure: COLONOSCOPY FOR SCREENING TO CECUM WITH POLYPECTOMY Diagnosis:       Diarrhea, unspecified type      Bright red blood per rectum      Anal fissure      Encounter for screening colonoscopy      Ductal carcinoma in situ (DCIS) of right breast      (Diarrhea, unspecified type [R19.7])      (Bright red blood per rectum [K62.5])      (Anal fissure [K60.2])      (Encounter for screening colonoscopy [Z12.11])      (Ductal carcinoma in situ (DCIS) of right breast [D05.11])    Surgeons: Geovanny Morillo MD Provider: Darinel Mcarthur MD    Anesthesia Type: MAC ASA Status: 3          Anesthesia Type: MAC    Vitals  Vitals Value Taken Time   /67 02/13/23 1147   Temp 36.8 °C (98.2 °F) 02/13/23 1145   Pulse 89 02/13/23 1147   Resp 16 02/13/23 1145   SpO2 100 % 02/13/23 1147   Vitals shown include unvalidated device data.        Post Anesthesia Care and Evaluation    Patient location during evaluation: bedside  Pain management: adequate    Airway patency: patent  Anesthetic complications: No anesthetic complications    Cardiovascular status: acceptable  Respiratory status: acceptable  Hydration status: acceptable    Comments: */67 (BP Location: Left arm, Patient Position: Lying)   Pulse 89   Temp 36.8 °C (98.2 °F) (Temporal)   Resp 16   Ht 175.3 cm (69\")   Wt 61.7 kg (136 lb)   SpO2 100%   BMI 20.08 kg/m²         "

## 2023-02-13 NOTE — H&P
No chief complaint on file.      HPI  Diarrhea  Blood in stool         Problem List:    Patient Active Problem List   Diagnosis   • Ductal carcinoma in situ (DCIS) of right breast   • Diarrhea   • Bright red blood per rectum   • Anal fissure   • Encounter for screening colonoscopy       Medical History:    Past Medical History:   Diagnosis Date   • Allergies    • Ankle swelling    • Arthritis    • Back pain    • Breast cancer (HCC)     Right   • Disease of thyroid gland    • H/O Heart block     3rd degree   • Heartburn    • History of cataract    • History of rectal bleeding    • Pacemaker         Social History:    Social History     Socioeconomic History   • Marital status: Significant Other   • Years of education: High school   Tobacco Use   • Smoking status: Never   • Smokeless tobacco: Never   Vaping Use   • Vaping Use: Never used   Substance and Sexual Activity   • Alcohol use: Yes     Comment: OCC   • Drug use: Never   • Sexual activity: Defer       Family History:   Family History   Problem Relation Age of Onset   • Cancer Sister    • Cancer Brother    • Colon cancer Neg Hx    • Colon polyps Neg Hx    • Crohn's disease Neg Hx    • Irritable bowel syndrome Neg Hx    • Ulcerative colitis Neg Hx        Surgical History:   Past Surgical History:   Procedure Laterality Date   • COLONOSCOPY  2018   • INDUCED       x 2   • MASTECTOMY Bilateral 2019   • PACEMAKER IMPLANTATION  2019       No current facility-administered medications for this encounter.    Current Outpatient Medications:   •  multivitamin with minerals tablet tablet, Take  by mouth., Disp: , Rfl:   •  zinc gluconate 50 MG tablet, Take 50 mg by mouth Daily., Disp: , Rfl:     Allergies:   Allergies   Allergen Reactions   • Tape Hives   • Bactrim [Sulfamethoxazole-Trimethoprim] Unknown - Low Severity   • Keflex [Cephalexin] Unknown - Low Severity   • Macrobid [Nitrofurantoin] Unknown - Low Severity   • Macrolides And Ketolides Unknown - Low  Severity   • Sulfa Antibiotics Unknown - Low Severity   • Sulfamethoxazole Unknown - Low Severity   • Trimethoprim Unknown - Low Severity        The following portions of the patient's history were reviewed by me and updated as appropriate: review of systems, allergies, current medications, past family history, past medical history, past social history, past surgical history and problem list.    There were no vitals filed for this visit.    PHYSICAL EXAM:    CONSTITUTIONAL:  today's vital signs reviewed by me  GASTROINTESTINAL: abdomen is soft nontender nondistended with normal active bowel sounds, no masses are appreciated    Assessment/ Plan  Diarrhea  Blood in stool    colonoscopy    Risks and benefits as well as alternatives to endoscopic evaluation were explained to the patient and they voiced understanding and wish to proceed.  These risks include but are not limited to the risk of bleeding, perforation, adverse reaction to sedation, and missed lesions.  The patient was given the opportunity to ask questions prior to the endoscopic procedure.

## 2023-02-14 LAB
LAB AP CASE REPORT: NORMAL
LAB AP CLINICAL INFORMATION: NORMAL
PATH REPORT.FINAL DX SPEC: NORMAL
PATH REPORT.GROSS SPEC: NORMAL

## 2023-08-02 NOTE — TELEPHONE ENCOUNTER
lvm to schedule cls    Doxycycline Counseling:  Patient counseled regarding possible photosensitivity and increased risk for sunburn.  Patient instructed to avoid sunlight, if possible.  When exposed to sunlight, patients should wear protective clothing, sunglasses, and sunscreen.  The patient was instructed to call the office immediately if the following severe adverse effects occur:  hearing changes, easy bruising/bleeding, severe headache, or vision changes.  The patient verbalized understanding of the proper use and possible adverse effects of doxycycline.  All of the patient's questions and concerns were addressed.

## (undated) DEVICE — GOWN PROC ENDOARMOR GI LVL3 HY/SHLD UNIV

## (undated) DEVICE — FLEX ADVANTAGE 1500CC: Brand: FLEX ADVANTAGE

## (undated) DEVICE — SINGLE-USE BIOPSY FORCEPS: Brand: RADIAL JAW 4

## (undated) DEVICE — Device

## (undated) DEVICE — KT ORCA ORCAPOD DISP STRL

## (undated) DEVICE — CANN NASL CO2 TRULINK W/O2 A/

## (undated) DEVICE — VIAL FORMLN CAP 10PCT 20ML